# Patient Record
Sex: MALE | Race: OTHER | NOT HISPANIC OR LATINO | Employment: OTHER | URBAN - METROPOLITAN AREA
[De-identification: names, ages, dates, MRNs, and addresses within clinical notes are randomized per-mention and may not be internally consistent; named-entity substitution may affect disease eponyms.]

---

## 2018-03-20 ENCOUNTER — HOSPITAL ENCOUNTER (INPATIENT)
Facility: HOSPITAL | Age: 73
LOS: 2 days | Discharge: HOME | DRG: 151 | End: 2018-03-23
Attending: EMERGENCY MEDICINE | Admitting: INTERNAL MEDICINE
Payer: MEDICARE

## 2018-03-20 DIAGNOSIS — R04.0 EPISTAXIS: ICD-10-CM

## 2018-03-20 DIAGNOSIS — R00.0 SINUS TACHYCARDIA: Primary | ICD-10-CM

## 2018-03-20 LAB
ALBUMIN SERPL BCP-MCNC: 3.6 G/DL (ref 3.5–5)
ALP SERPL-CCNC: 89 U/L (ref 46–116)
ALT SERPL W P-5'-P-CCNC: 43 U/L (ref 12–78)
ANION GAP SERPL CALCULATED.3IONS-SCNC: 13 MMOL/L (ref 4–13)
APTT PPP: 27 SECONDS (ref 23–35)
AST SERPL W P-5'-P-CCNC: 22 U/L (ref 5–45)
ATRIAL RATE: 127 BPM
ATRIAL RATE: 136 BPM
BASOPHILS # BLD AUTO: 0.03 THOUSANDS/ΜL (ref 0–0.1)
BASOPHILS # BLD AUTO: 0.03 THOUSANDS/ΜL (ref 0–0.1)
BASOPHILS NFR BLD AUTO: 0 % (ref 0–1)
BASOPHILS NFR BLD AUTO: 0 % (ref 0–1)
BILIRUB SERPL-MCNC: 0.2 MG/DL (ref 0.2–1)
BUN SERPL-MCNC: 19 MG/DL (ref 5–25)
CALCIUM SERPL-MCNC: 9 MG/DL (ref 8.3–10.1)
CHLORIDE SERPL-SCNC: 104 MMOL/L (ref 100–108)
CO2 SERPL-SCNC: 23 MMOL/L (ref 21–32)
CREAT SERPL-MCNC: 0.74 MG/DL (ref 0.6–1.3)
EOSINOPHIL # BLD AUTO: 0.01 THOUSAND/ΜL (ref 0–0.61)
EOSINOPHIL # BLD AUTO: 0.18 THOUSAND/ΜL (ref 0–0.61)
EOSINOPHIL NFR BLD AUTO: 0 % (ref 0–6)
EOSINOPHIL NFR BLD AUTO: 2 % (ref 0–6)
ERYTHROCYTE [DISTWIDTH] IN BLOOD BY AUTOMATED COUNT: 13.1 % (ref 11.6–15.1)
ERYTHROCYTE [DISTWIDTH] IN BLOOD BY AUTOMATED COUNT: 13.1 % (ref 11.6–15.1)
GFR SERPL CREATININE-BSD FRML MDRD: 92 ML/MIN/1.73SQ M
GLUCOSE SERPL-MCNC: 134 MG/DL (ref 65–140)
HCT VFR BLD AUTO: 40 % (ref 36.5–49.3)
HCT VFR BLD AUTO: 44.9 % (ref 36.5–49.3)
HGB BLD-MCNC: 13.1 G/DL (ref 12–17)
HGB BLD-MCNC: 14.8 G/DL (ref 12–17)
INR PPP: 1 (ref 0.86–1.16)
LYMPHOCYTES # BLD AUTO: 1.76 THOUSANDS/ΜL (ref 0.6–4.47)
LYMPHOCYTES # BLD AUTO: 2.06 THOUSANDS/ΜL (ref 0.6–4.47)
LYMPHOCYTES NFR BLD AUTO: 17 % (ref 14–44)
LYMPHOCYTES NFR BLD AUTO: 28 % (ref 14–44)
MCH RBC QN AUTO: 31.5 PG (ref 26.8–34.3)
MCH RBC QN AUTO: 31.9 PG (ref 26.8–34.3)
MCHC RBC AUTO-ENTMCNC: 32.8 G/DL (ref 31.4–37.4)
MCHC RBC AUTO-ENTMCNC: 33 G/DL (ref 31.4–37.4)
MCV RBC AUTO: 96 FL (ref 82–98)
MCV RBC AUTO: 97 FL (ref 82–98)
MONOCYTES # BLD AUTO: 0.62 THOUSAND/ΜL (ref 0.17–1.22)
MONOCYTES # BLD AUTO: 0.86 THOUSAND/ΜL (ref 0.17–1.22)
MONOCYTES NFR BLD AUTO: 12 % (ref 4–12)
MONOCYTES NFR BLD AUTO: 6 % (ref 4–12)
NEUTROPHILS # BLD AUTO: 4.22 THOUSANDS/ΜL (ref 1.85–7.62)
NEUTROPHILS # BLD AUTO: 8.16 THOUSANDS/ΜL (ref 1.85–7.62)
NEUTS SEG NFR BLD AUTO: 57 % (ref 43–75)
NEUTS SEG NFR BLD AUTO: 77 % (ref 43–75)
NRBC BLD AUTO-RTO: 0 /100 WBCS
NRBC BLD AUTO-RTO: 0 /100 WBCS
P AXIS: 42 DEGREES
P AXIS: 53 DEGREES
PLATELET # BLD AUTO: 214 THOUSANDS/UL (ref 149–390)
PLATELET # BLD AUTO: 254 THOUSANDS/UL (ref 149–390)
PMV BLD AUTO: 10 FL (ref 8.9–12.7)
PMV BLD AUTO: 10.9 FL (ref 8.9–12.7)
POTASSIUM SERPL-SCNC: 3.9 MMOL/L (ref 3.5–5.3)
PR INTERVAL: 128 MS
PR INTERVAL: 160 MS
PROT SERPL-MCNC: 7.5 G/DL (ref 6.4–8.2)
PROTHROMBIN TIME: 13.4 SECONDS (ref 12.1–14.4)
QRS AXIS: 250 DEGREES
QRS AXIS: 258 DEGREES
QRSD INTERVAL: 114 MS
QRSD INTERVAL: 116 MS
QT INTERVAL: 290 MS
QT INTERVAL: 308 MS
QTC INTERVAL: 436 MS
QTC INTERVAL: 447 MS
RBC # BLD AUTO: 4.11 MILLION/UL (ref 3.88–5.62)
RBC # BLD AUTO: 4.7 MILLION/UL (ref 3.88–5.62)
SODIUM SERPL-SCNC: 140 MMOL/L (ref 136–145)
T WAVE AXIS: 18 DEGREES
T WAVE AXIS: 24 DEGREES
TROPONIN I SERPL-MCNC: <0.02 NG/ML
VENTRICULAR RATE: 127 BPM
VENTRICULAR RATE: 136 BPM
WBC # BLD AUTO: 10.62 THOUSAND/UL (ref 4.31–10.16)
WBC # BLD AUTO: 7.38 THOUSAND/UL (ref 4.31–10.16)

## 2018-03-20 PROCEDURE — 93005 ELECTROCARDIOGRAM TRACING: CPT

## 2018-03-20 PROCEDURE — 85025 COMPLETE CBC W/AUTO DIFF WBC: CPT | Performed by: EMERGENCY MEDICINE

## 2018-03-20 PROCEDURE — 96361 HYDRATE IV INFUSION ADD-ON: CPT

## 2018-03-20 PROCEDURE — 36415 COLL VENOUS BLD VENIPUNCTURE: CPT | Performed by: EMERGENCY MEDICINE

## 2018-03-20 PROCEDURE — 99220 PR INITIAL OBSERVATION CARE/DAY 70 MINUTES: CPT | Performed by: INTERNAL MEDICINE

## 2018-03-20 PROCEDURE — 85610 PROTHROMBIN TIME: CPT | Performed by: EMERGENCY MEDICINE

## 2018-03-20 PROCEDURE — 80053 COMPREHEN METABOLIC PANEL: CPT | Performed by: EMERGENCY MEDICINE

## 2018-03-20 PROCEDURE — 84484 ASSAY OF TROPONIN QUANT: CPT | Performed by: EMERGENCY MEDICINE

## 2018-03-20 PROCEDURE — 93010 ELECTROCARDIOGRAM REPORT: CPT | Performed by: INTERNAL MEDICINE

## 2018-03-20 PROCEDURE — 85730 THROMBOPLASTIN TIME PARTIAL: CPT | Performed by: EMERGENCY MEDICINE

## 2018-03-20 PROCEDURE — 96374 THER/PROPH/DIAG INJ IV PUSH: CPT

## 2018-03-20 RX ORDER — CLOPIDOGREL BISULFATE 75 MG/1
75 TABLET ORAL DAILY
Status: DISCONTINUED | OUTPATIENT
Start: 2018-03-21 | End: 2018-03-23 | Stop reason: HOSPADM

## 2018-03-20 RX ORDER — TRANEXAMIC ACID 100 MG/ML
500 INJECTION, SOLUTION INTRAVENOUS ONCE
Status: COMPLETED | OUTPATIENT
Start: 2018-03-20 | End: 2018-03-20

## 2018-03-20 RX ORDER — OXYCODONE HYDROCHLORIDE 5 MG/1
5 TABLET ORAL 2 TIMES DAILY PRN
Status: DISCONTINUED | OUTPATIENT
Start: 2018-03-20 | End: 2018-03-23 | Stop reason: HOSPADM

## 2018-03-20 RX ORDER — ACETAMINOPHEN 325 MG/1
650 TABLET ORAL EVERY 4 HOURS PRN
Status: DISCONTINUED | OUTPATIENT
Start: 2018-03-20 | End: 2018-03-23 | Stop reason: HOSPADM

## 2018-03-20 RX ORDER — OXYMETAZOLINE HYDROCHLORIDE 0.05 G/100ML
2 SPRAY NASAL ONCE
Status: COMPLETED | OUTPATIENT
Start: 2018-03-20 | End: 2018-03-20

## 2018-03-20 RX ORDER — METOPROLOL TARTRATE 5 MG/5ML
5 INJECTION INTRAVENOUS ONCE
Status: COMPLETED | OUTPATIENT
Start: 2018-03-20 | End: 2018-03-20

## 2018-03-20 RX ADMIN — LIDOCAINE HYDROCHLORIDE 5 ML: 20 SOLUTION ORAL; TOPICAL at 18:26

## 2018-03-20 RX ADMIN — METOPROLOL TARTRATE 5 MG: 5 INJECTION, SOLUTION INTRAVENOUS at 20:30

## 2018-03-20 RX ADMIN — OXYMETAZOLINE HYDROCHLORIDE 2 SPRAY: 5 SPRAY NASAL at 18:27

## 2018-03-20 RX ADMIN — TRANEXAMIC ACID 500 MG: 100 INJECTION, SOLUTION INTRAVENOUS at 18:27

## 2018-03-20 RX ADMIN — SODIUM CHLORIDE 1000 ML: 0.9 INJECTION, SOLUTION INTRAVENOUS at 18:27

## 2018-03-20 NOTE — ED PROVIDER NOTES
History  Chief Complaint   Patient presents with    Nose Bleed     pt had nose bleed 1 wk ago was stopped by EMS then, nose bleed again today on Plavix, still actively bleeding, hypertensive and tachy with no hx of HTN or arrythmias        Nose Bleed   Location:  R nare  Severity:  Moderate  Duration:  2 hours  Timing:  Constant  Progression:  Partially resolved  Chronicity:  Recurrent  Context: anticoagulants, aspirin use, elevation change and foreign body    Context: not home oxygen and not nose picking    Relieved by:  Applying pressure  Worsened by:  Nothing  Ineffective treatments:  Applying pressure  Associated symptoms: no blood in oropharynx, no congestion, no cough, no fever and no sneezing    Risk factors: alcohol use (reports when family is over)    Risk factors: no frequent nosebleeds        None       Past Medical History:   Diagnosis Date    Deviated nasal septum     Stroke [facility])        History reviewed  No pertinent surgical history  History reviewed  No pertinent family history  I have reviewed and agree with the history as documented  Social History   Substance Use Topics    Smoking status: Former Smoker    Smokeless tobacco: Never Used    Alcohol use No        Review of Systems   Constitutional: Negative for fever  HENT: Positive for nosebleeds  Negative for congestion and sneezing  Respiratory: Negative for cough  All other systems reviewed and are negative  Physical Exam  ED Triage Vitals [03/20/18 1741]   Temp Pulse Respirations Blood Pressure SpO2   -- (!) 131 18 (!) 157/108 97 %      Temp src Heart Rate Source Patient Position - Orthostatic VS BP Location FiO2 (%)   -- Monitor Sitting Right arm --      Pain Score       No Pain           Orthostatic Vital Signs  Vitals:    03/20/18 1741   BP: (!) 157/108   Pulse: (!) 131   Patient Position - Orthostatic VS: Sitting       Physical Exam   Constitutional: He is oriented to person, place, and time   He appears well-developed and well-nourished  No distress  HENT:   Head: Normocephalic and atraumatic  Nose: Epistaxis (from left nare with soaked packing, no blood in posterior oropharynx) is observed  Eyes: EOM are normal  Pupils are equal, round, and reactive to light  Neck: Neck supple  Cardiovascular: Regular rhythm  Tachycardia present  Pulmonary/Chest: Effort normal and breath sounds normal    Abdominal: Soft  There is no tenderness  Musculoskeletal: He exhibits no edema  Neurological: He is alert and oriented to person, place, and time  Left sided hemiparesis 2/5 strength, chronic from an old stroke   Skin: Capillary refill takes less than 2 seconds  He is not diaphoretic  No pallor  ED Medications  Medications - No data to display    Diagnostic Studies  Results Reviewed     None                 No orders to display              Procedures  Procedures       Phone Contacts  ED Phone Contact    ED Course  ED Course                                MDM  Number of Diagnoses or Management Options  Epistaxis: new and requires workup  Sinus tachycardia: new and requires workup     Amount and/or Complexity of Data Reviewed  Clinical lab tests: ordered and reviewed  Discuss the patient with other providers: yes (ENT)  Independent visualization of images, tracings, or specimens: yes    Risk of Complications, Morbidity, and/or Mortality  Presenting problems: high    Patient Progress  Patient progress: improved (Patient was packed in the emergency department and bleeding stopped  He has no feeling of bleeding down the back of his throat  However it is unclear why patient is tachycardic  He has been persistently sinus tach with repeat EKGs and evaluated on the monitor and he has been sinus tachycardic in the 140-1 50s with a right bundle branch block  Patient was given 5 of Lopressor which did bring his heart rate to under 100    Patient has no chest pain shortness of breath and is asymptomatic but unclear etiology of why he is so tachycardic  Patient has prior stroke and lives alone  Repeated CBC to see if patient really did have such a significant nosebleed to make him so tachycardic, but his exam did not seem consistent with tachycardia related to hemorrhagic blood loss because patient was not pale, sweaty diaphoretic and was not symptomatic or dizzy  Repeat CBC shows a drop of 1 but this would not explain his tachycardia  Given significant unexplained tachycardia patient needs to be admitted to the hospital for or tele evaluation and cardiology evaluation  I called and discussed with ENT and they do come to [facility] in consult and they could see him tomorrow and addresses packing and nosebleed as well )    CritCare Time    Disposition  Final diagnoses:   None     ED Disposition     None      Follow-up Information    None       Patient's Medications    No medications on file     No discharge procedures on file      ED Provider  Electronically Signed by           Paul Riddle MD  03/22/18 5425

## 2018-03-20 NOTE — ED PROCEDURE NOTE
PROCEDURE  Epistaxis Mgmt  Date/Time: 3/20/2018 7:22 PM  Performed by: Yee Rodriguez  Authorized by: Yee Rodriguez     Patient location:  ED  Other Assisting Provider: No    Consent:     Consent obtained:  Verbal    Consent given by:  Patient    Risks discussed:  Bleeding and pain    Alternatives discussed:  Referral  Universal protocol:     Patient identity confirmed:  Verbally with patient  Anesthesia (see MAR for exact dosages): Anesthesia method:  Topical application    Local Therapeutics:  Lidocaine gel and oxymetazoline (Afrin) (TXA)  Procedure details:     Treatment site:  R anterior    Hemostasis method:  Merocel sponge    Approach:  External  Post-procedure details:     Assessment:  Bleeding stopped    Patient tolerance of procedure:   Tolerated well, no immediate complications         Flo Burger MD  03/20/18 7955

## 2018-03-21 LAB
ANION GAP SERPL CALCULATED.3IONS-SCNC: 8 MMOL/L (ref 4–13)
BUN SERPL-MCNC: 23 MG/DL (ref 5–25)
CALCIUM SERPL-MCNC: 8.4 MG/DL (ref 8.3–10.1)
CHLORIDE SERPL-SCNC: 107 MMOL/L (ref 100–108)
CO2 SERPL-SCNC: 25 MMOL/L (ref 21–32)
CREAT SERPL-MCNC: 0.71 MG/DL (ref 0.6–1.3)
ERYTHROCYTE [DISTWIDTH] IN BLOOD BY AUTOMATED COUNT: 13.1 % (ref 11.6–15.1)
GFR SERPL CREATININE-BSD FRML MDRD: 94 ML/MIN/1.73SQ M
GLUCOSE SERPL-MCNC: 113 MG/DL (ref 65–140)
HCT VFR BLD AUTO: 36.5 % (ref 36.5–49.3)
HGB BLD-MCNC: 12.2 G/DL (ref 12–17)
MCH RBC QN AUTO: 32.7 PG (ref 26.8–34.3)
MCHC RBC AUTO-ENTMCNC: 33.4 G/DL (ref 31.4–37.4)
MCV RBC AUTO: 98 FL (ref 82–98)
PLATELET # BLD AUTO: 184 THOUSANDS/UL (ref 149–390)
PMV BLD AUTO: 10.6 FL (ref 8.9–12.7)
POTASSIUM SERPL-SCNC: 4.1 MMOL/L (ref 3.5–5.3)
RBC # BLD AUTO: 3.73 MILLION/UL (ref 3.88–5.62)
SODIUM SERPL-SCNC: 140 MMOL/L (ref 136–145)
WBC # BLD AUTO: 7.67 THOUSAND/UL (ref 4.31–10.16)

## 2018-03-21 PROCEDURE — 99233 SBSQ HOSP IP/OBS HIGH 50: CPT | Performed by: INTERNAL MEDICINE

## 2018-03-21 PROCEDURE — 36415 COLL VENOUS BLD VENIPUNCTURE: CPT | Performed by: PHYSICIAN ASSISTANT

## 2018-03-21 PROCEDURE — 99284 EMERGENCY DEPT VISIT MOD MDM: CPT

## 2018-03-21 PROCEDURE — 2Y41X5Z PACKING OF NASAL REGION USING PACKING MATERIAL: ICD-10-PCS | Performed by: INTERNAL MEDICINE

## 2018-03-21 PROCEDURE — 80048 BASIC METABOLIC PNL TOTAL CA: CPT | Performed by: PHYSICIAN ASSISTANT

## 2018-03-21 PROCEDURE — 85027 COMPLETE CBC AUTOMATED: CPT | Performed by: PHYSICIAN ASSISTANT

## 2018-03-21 RX ORDER — OXYMETAZOLINE HYDROCHLORIDE 0.05 G/100ML
2 SPRAY NASAL EVERY 12 HOURS PRN
Status: DISCONTINUED | OUTPATIENT
Start: 2018-03-21 | End: 2018-03-23 | Stop reason: HOSPADM

## 2018-03-21 RX ORDER — METOPROLOL TARTRATE 5 MG/5ML
5 INJECTION INTRAVENOUS
Status: DISCONTINUED | OUTPATIENT
Start: 2018-03-21 | End: 2018-03-22

## 2018-03-21 RX ORDER — ALPRAZOLAM 0.5 MG/1
0.5 TABLET ORAL 3 TIMES DAILY PRN
Status: DISCONTINUED | OUTPATIENT
Start: 2018-03-21 | End: 2018-03-23 | Stop reason: HOSPADM

## 2018-03-21 RX ORDER — LORAZEPAM 0.5 MG/1
0.5 TABLET ORAL EVERY 8 HOURS PRN
Status: DISCONTINUED | OUTPATIENT
Start: 2018-03-21 | End: 2018-03-21

## 2018-03-21 RX ADMIN — OXYMETAZOLINE HYDROCHLORIDE 2 SPRAY: 5 SPRAY NASAL at 21:34

## 2018-03-21 RX ADMIN — OXYCODONE HYDROCHLORIDE 5 MG: 5 TABLET ORAL at 07:00

## 2018-03-21 RX ADMIN — ACETAMINOPHEN 650 MG: 325 TABLET, FILM COATED ORAL at 11:55

## 2018-03-21 RX ADMIN — OXYCODONE HYDROCHLORIDE 5 MG: 5 TABLET ORAL at 00:54

## 2018-03-21 RX ADMIN — LORAZEPAM 0.5 MG: 0.5 TABLET ORAL at 13:39

## 2018-03-21 RX ADMIN — METOPROLOL TARTRATE 5 MG: 5 INJECTION, SOLUTION INTRAVENOUS at 21:42

## 2018-03-21 RX ADMIN — OXYMETAZOLINE HYDROCHLORIDE 2 SPRAY: 5 SPRAY NASAL at 13:39

## 2018-03-21 RX ADMIN — OXYCODONE HYDROCHLORIDE 5 MG: 5 TABLET ORAL at 21:32

## 2018-03-21 RX ADMIN — ACETAMINOPHEN 650 MG: 325 TABLET, FILM COATED ORAL at 17:27

## 2018-03-21 NOTE — ED NOTES
Patient refuses breakfast at this time   States "i can't eat with my nose packed like this, I have to breathe through my mouth "     Ac Choudhary RN  03/21/18 1746

## 2018-03-21 NOTE — PROGRESS NOTES
Epistaxis addressed by ED team overnight with packing  Plan is to leave packing in place for minimum 24-48 hours, hold asa/plavix or both if able, nosebleed precautions  If patient to remain in the hospital during this time due to tachycardia workup, will plan to pull pack at bedside, otherwise he may follow up as outpatient (Thur/Fri in one of the [employer] on Monday)  Continue abx ppx in the meantime

## 2018-03-21 NOTE — PLAN OF CARE
Problem: PAIN - ADULT  Goal: Verbalizes/displays adequate comfort level or baseline comfort level  Interventions:  - Encourage patient to monitor pain and request assistance  - Assess pain using appropriate pain scale  - Administer analgesics based on type and severity of pain and evaluate response  - Implement non-pharmacological measures as appropriate and evaluate response  - Consider cultural and social influences on pain and pain management  - Notify physician/advanced practitioner if interventions unsuccessful or patient reports new pain  Outcome: Progressing      Problem: SAFETY ADULT  Goal: Patient will remain free of falls  INTERVENTIONS:  - Assess patient frequently for physical needs  -  Identify cognitive and physical deficits and behaviors that affect risk of falls    -  [place] fall precautions as indicated by assessment   - Educate patient/family on patient safety including physical limitations  - Instruct patient to call for assistance with activity based on assessment  - Modify environment to reduce risk of injury  - Consider OT/PT consult to assist with strengthening/mobility  Outcome: Progressing    Goal: Maintain or return to baseline ADL function  INTERVENTIONS:  -  Assess patient's ability to carry out ADLs; assess patient's baseline for ADL function and identify physical deficits which impact ability to perform ADLs (bathing, care of mouth/teeth, toileting, grooming, dressing, etc )  - Assess/evaluate cause of self-care deficits   - Assess range of motion  - Assess patient's mobility; develop plan if impaired  - Assess patient's need for assistive devices and provide as appropriate  - Encourage maximum independence but intervene and supervise when necessary  ¯ Involve family in performance of ADLs  ¯ Assess for home care needs following discharge   ¯ Request OT consult to assist with ADL evaluation and planning for discharge  ¯ Provide patient education as appropriate  Outcome: Progressing    Goal: Maintain or return mobility status to optimal level  INTERVENTIONS:  - Assess patient's baseline mobility status (ambulation, transfers, stairs, etc )    - Identify cognitive and physical deficits and behaviors that affect mobility  - Identify mobility aids required to assist with transfers and/or ambulation (gait belt, sit-to-stand, lift, walker, cane, etc )  - [place] fall precautions as indicated by assessment  - Record patient progress and toleration of activity level on Mobility SBAR; progress patient to next Phase/Stage  - Instruct patient to call for assistance with activity based on assessment  - Request Rehabilitation consult to assist with strengthening/weightbearing, etc   Outcome: Progressing

## 2018-03-21 NOTE — H&P
H&P- Desmond Yoder [id number], 67 y o  male MRN: [id number]    Unit/Bed#: ED 27 Encounter: [id number]    Primary Care Provider: Alejandra Blair MD   Date and time admitted to hospital: 3/20/2018  5:37 PM        * Tachycardia   Assessment & Plan    Admit telemetry  P r n  Lopressor        Epistaxis   Assessment & Plan    Maintain packing  Consult ENT              VTE Prophylaxis: Pharmacologic VTE Prophylaxis contraindicated due to Epistaxis  / sequential compression device   Code Status:  Full  POLST: There is no POLST form on file for this patient (pre-hospital)  Discussion with family:  There is no family present for evaluation  Anticipated Length of Stay:  Patient will be admitted on an Observation basis with an anticipated length of stay of  less than 2 midnights  Justification for Hospital Stay:  Observation, ENT consult    Total Time for Visit, including Counseling / Coordination of Care: 30 minutes  Greater than 50% of this total time spent on direct patient counseling and coordination of care  Chief Complaint:   Epistasis    History of Present Illness:    Desmond Yoder is a 67 y o  male who presents with complaints of a blood nose that began around noon today  Patient denies any chest pain or shortness of breath  Patient states he is not able to get the bleeding to stop  Patient takes Plavix secondary history of CVA  One emerged department is noted the patient was tachycardic up to the 160s  Patient is asymptomatic with this  Patient states he had may have had some is similar in the past for he was monitored overnight and cleared  Review of Systems:    Review of Systems   Cardiovascular: Positive for palpitations  All other systems reviewed and are negative  Past Medical and Surgical History:     Past Medical History:   Diagnosis Date    Deviated nasal septum     Stroke [facility])        History reviewed  No pertinent surgical history      Meds/Allergies:    Prior to Admission medications    Not on File     I have reviewed home medications with patient personally  Allergies: No Known Allergies    Social History:     Marital Status: /Civil Union   Patient Pre-hospital Living Situation:  Lives at home  Patient Pre-hospital Level of Mobility:  Ambulatory  Patient Pre-hospital Diet Restrictions:  None  Substance Use History:   History   Alcohol Use No     History   Smoking Status    Former Smoker   Smokeless Tobacco    Never Used     History   Drug Use No       Family History:    History reviewed  No pertinent family history  Physical Exam:     Vitals:   Blood Pressure: (!) 157/108 (03/20/18 1741)  Pulse: (!) 135 (03/1945)  Temperature: 97 7 °F (36 5 °C) (03/20/18 1805)  Temp Source: Oral (03/20/18 1805)  Respirations: 19 (03/1945)  Height: 5' 7" (170 2 cm) (03/20/18 1741)  Weight - Scale: 88 kg (194 lb) (03/20/18 1741)  SpO2: 97 % (03/20/18 1741)    Physical Exam   Constitutional: He is oriented to person, place, and time  He appears well-developed and well-nourished  HENT:   Head: Normocephalic and atraumatic  Right Ear: External ear normal    Left Ear: External ear normal    Nose is packed  There is dried blood from the nose bleed noted on his face  Eyes: Conjunctivae and EOM are normal  Pupils are equal, round, and reactive to light  Neck: Normal range of motion  Cardiovascular: Normal rate, regular rhythm, normal heart sounds and intact distal pulses  Pulmonary/Chest: Effort normal and breath sounds normal    Abdominal: Bowel sounds are normal    Musculoskeletal: Normal range of motion  Neurological: He is alert and oriented to person, place, and time  Skin: Skin is warm and dry  Psychiatric: He has a normal mood and affect  His behavior is normal  Judgment and thought content normal          Additional Data:     Lab Results: I have personally reviewed pertinent reports          Results from last 7 days  Lab Units 03/20/18  2918   WBC Thousand/uL 10 62*   HEMOGLOBIN g/dL 13 1   HEMATOCRIT % 40 0   PLATELETS Thousands/uL 214   NEUTROS PCT % 77*   LYMPHS PCT % 17   MONOS PCT % 6   EOS PCT % 0       Results from last 7 days  Lab Units 03/20/18  1814   SODIUM mmol/L 140   POTASSIUM mmol/L 3 9   CHLORIDE mmol/L 104   CO2 mmol/L 23   BUN mg/dL 19   CREATININE mg/dL 0 74   CALCIUM mg/dL 9 0   TOTAL PROTEIN g/dL 7 5   BILIRUBIN TOTAL mg/dL 0 20   ALK PHOS U/L 89   ALT U/L 43   AST U/L 22   GLUCOSE RANDOM mg/dL 134       Results from last 7 days  Lab Units 03/20/18  1814   INR  1 00       Imaging: I have personally reviewed pertinent reports  No orders to display       EKG, Pathology, and Other Studies Reviewed on Admission:   · EKG:  Sinus tachycardia, right bundle branch block    Allscripts / Epic Records Reviewed: Yes     ** Please Note: This note has been constructed using a voice recognition system   **

## 2018-03-21 NOTE — CASE MANAGEMENT
Initial Clinical Review    Admission: Date/Time/Statement: OBS order   3/20  2259    Orders Placed This Encounter   Procedures    Place in Observation (expected length of stay for this patient is less than two midnights)     Standing Status:   Standing     Number of Occurrences:   1     Order Specific Question:   Admitting Physician     Answer:   Vania Pang [[id number]]     Order Specific Question:   Level of Care     Answer:   Med Surg [16]         ED: Date/Time/Mode of Arrival:   ED Arrival Information     Expected Arrival Acuity Means of Arrival Escorted By Service Admission Type    - 3/20/2018 17:36 Emergent Ambulance [facility]) General Medicine Emergency    Arrival Complaint    NOSE BLEED          Chief Complaint:   Chief Complaint   Patient presents with    Nose Bleed     pt had nose bleed 1 wk ago was stopped by EMS then, nose bleed again today on Plavix, still actively bleeding, hypertensive and tachy with no hx of HTN or arrythmias        History of Illness: Suman Ivory is a 67 y o  male who presents with complaints of a blood nose that began around noon today  Patient denies any chest pain or shortness of breath  Patient states he is not able to get the bleeding to stop  Patient takes Plavix secondary history of CVA  One emerged department is noted the patient was tachycardic up to the 160s  Patient is asymptomatic with this    Patient states he had may have had some is similar in the past for he was monitored overnight and cleared        ED Vital Signs:   ED Triage Vitals   Temperature Pulse Respirations Blood Pressure SpO2   03/20/18 1805 03/20/18 1741 03/20/18 1741 03/20/18 1741 03/20/18 1741   97 7 °F (36 5 °C) (!) 131 18 (!) 157/108 97 %      Temp Source Heart Rate Source Patient Position - Orthostatic VS BP Location FiO2 (%)   03/20/18 1805 03/20/18 1741 03/20/18 1741 03/20/18 1741 --   Oral Monitor Sitting Right arm       Pain Score       03/20/18 1741       No Pain        Wt Readings from Last 1 Encounters:   03/20/18 88 kg (194 lb)       Vital Signs (abnormal):   03/21/18 0519 -- 91 20 125/83 96 % -- LR   03/21/18 0345 -- 88 20 -- 95 % -- JLG   03/21/18 0056 --  109 20 134/94 96 % -- JLG   03/1945 --  135 19           Abnormal Labs/Diagnostic Test Results: wbc  10 62  EKG - ST  136 and 127    ED Treatment:   Medication Administration from 03/20/2018 1736 to 03/21/2018 1383       Date/Time Order Dose Route Action Action by Comments     03/20/2018 2115 sodium chloride 0 9 % bolus 1,000 mL 0 mL Intravenous Stopped Huan Reyes RN      03/20/2018 1827 sodium chloride 0 9 % bolus 1,000 mL 1,000 mL [facility]venous [facility]      03/20/2018 1827 oxymetazoline (AFRIN) 0 05 % nasal spray 2 spray 2 spray Each Nare Given Charles Ryan RN      03/20/2018 1827 tranexamic acid 100mg/mL (for epistaxis) 500 mg 500 mg Nasal Given Charles Ryan RN      03/20/2018 1826 lidocaine viscous (XYLOCAINE) 2 % mucosal solution 5 mL 5 mL Oral Given Charles Ryan RN      03/20/2018 2030 metoprolol (LOPRESSOR) injection 5 mg 5 mg Intravenous Given Huan Reyes RN      03/21/2018 0700 oxyCODONE (ROXICODONE) IR tablet 5 mg 5 mg Oral Given Angelique Vásquez RN      03/21/2018 0054 oxyCODONE (ROXICODONE) IR tablet 5 mg 5 mg Oral Given Taina Reyes RN           Past Medical/Surgical History: Active Ambulatory Problems     Diagnosis Date Noted    No Active Ambulatory Problems     Resolved Ambulatory Problems     Diagnosis Date Noted    No Resolved Ambulatory Problems     Past Medical History:   Diagnosis Date    Deviated nasal septum     Stroke [facility])        Admitting Diagnosis: Bleeding from the nose [R04 0]    Age/Sex: 67 y o  male    Assessment/Plan:   Tachycardia   Assessment & Plan     Admit telemetry  P r n   Lopressor          Epistaxis   Assessment & Plan     Maintain packing  Consult ENT             VTE Prophylaxis: Pharmacologic VTE Prophylaxis contraindicated due to Epistaxis  / sequential compression device   Code Status:  Full  POLST: There is no POLST form on file for this patient (pre-hospital)  Discussion with family:  There is no family present for evaluation      Anticipated Length of Stay:  Patient will be admitted on an Observation basis with an anticipated length of stay of  less than 2 midnights     Justification for Hospital Stay:  Observation, ENT consult      Admission Orders:  Scheduled Meds:   Current Facility-Administered Medications:  acetaminophen 650 mg Oral Q4H PRN NAZ Mason-LIZETTE   clopidogrel 75 mg Oral Daily NAZ Mason-LIZETTE   oxyCODONE 5 mg Oral BID PRN Saira Blount PA-C     Continuous Infusions:    PRN Meds:   acetaminophen    oxyCODONE    Reg diet   Up and OOB as beverly   EKG prn cp   ENT consult   Tele   SCD  3/21 cbc

## 2018-03-22 LAB
ANION GAP SERPL CALCULATED.3IONS-SCNC: 9 MMOL/L (ref 4–13)
BASOPHILS # BLD AUTO: 0.03 THOUSANDS/ΜL (ref 0–0.1)
BASOPHILS NFR BLD AUTO: 0 % (ref 0–1)
BUN SERPL-MCNC: 20 MG/DL (ref 5–25)
CALCIUM SERPL-MCNC: 8.7 MG/DL (ref 8.3–10.1)
CHLORIDE SERPL-SCNC: 103 MMOL/L (ref 100–108)
CO2 SERPL-SCNC: 27 MMOL/L (ref 21–32)
CREAT SERPL-MCNC: 0.88 MG/DL (ref 0.6–1.3)
EOSINOPHIL # BLD AUTO: 0.12 THOUSAND/ΜL (ref 0–0.61)
EOSINOPHIL NFR BLD AUTO: 2 % (ref 0–6)
ERYTHROCYTE [DISTWIDTH] IN BLOOD BY AUTOMATED COUNT: 13.3 % (ref 11.6–15.1)
GFR SERPL CREATININE-BSD FRML MDRD: 86 ML/MIN/1.73SQ M
GLUCOSE SERPL-MCNC: 106 MG/DL (ref 65–140)
HCT VFR BLD AUTO: 36.9 % (ref 36.5–49.3)
HGB BLD-MCNC: 12.1 G/DL (ref 12–17)
LYMPHOCYTES # BLD AUTO: 1.85 THOUSANDS/ΜL (ref 0.6–4.47)
LYMPHOCYTES NFR BLD AUTO: 23 % (ref 14–44)
MCH RBC QN AUTO: 31.9 PG (ref 26.8–34.3)
MCHC RBC AUTO-ENTMCNC: 32.8 G/DL (ref 31.4–37.4)
MCV RBC AUTO: 97 FL (ref 82–98)
MONOCYTES # BLD AUTO: 0.76 THOUSAND/ΜL (ref 0.17–1.22)
MONOCYTES NFR BLD AUTO: 10 % (ref 4–12)
NEUTROPHILS # BLD AUTO: 5.19 THOUSANDS/ΜL (ref 1.85–7.62)
NEUTS SEG NFR BLD AUTO: 65 % (ref 43–75)
NRBC BLD AUTO-RTO: 0 /100 WBCS
PLATELET # BLD AUTO: 218 THOUSANDS/UL (ref 149–390)
PMV BLD AUTO: 10.6 FL (ref 8.9–12.7)
POTASSIUM SERPL-SCNC: 4 MMOL/L (ref 3.5–5.3)
RBC # BLD AUTO: 3.79 MILLION/UL (ref 3.88–5.62)
SODIUM SERPL-SCNC: 139 MMOL/L (ref 136–145)
WBC # BLD AUTO: 7.99 THOUSAND/UL (ref 4.31–10.16)

## 2018-03-22 PROCEDURE — 85025 COMPLETE CBC W/AUTO DIFF WBC: CPT | Performed by: INTERNAL MEDICINE

## 2018-03-22 PROCEDURE — 99233 SBSQ HOSP IP/OBS HIGH 50: CPT | Performed by: INTERNAL MEDICINE

## 2018-03-22 PROCEDURE — 80048 BASIC METABOLIC PNL TOTAL CA: CPT | Performed by: INTERNAL MEDICINE

## 2018-03-22 RX ADMIN — ALPRAZOLAM 0.5 MG: 0.5 TABLET ORAL at 23:31

## 2018-03-22 RX ADMIN — METOPROLOL TARTRATE 25 MG: 25 TABLET ORAL at 00:00

## 2018-03-22 RX ADMIN — OXYCODONE HYDROCHLORIDE 5 MG: 5 TABLET ORAL at 22:02

## 2018-03-22 RX ADMIN — METOPROLOL TARTRATE 25 MG: 25 TABLET ORAL at 08:50

## 2018-03-22 RX ADMIN — OXYMETAZOLINE HYDROCHLORIDE 2 SPRAY: 5 SPRAY NASAL at 21:59

## 2018-03-22 RX ADMIN — ACETAMINOPHEN 650 MG: 325 TABLET, FILM COATED ORAL at 05:00

## 2018-03-22 NOTE — PROGRESS NOTES
Packing was removed from the right naris  Source of bleeding likely from indentation along right anterior nasal septum  No active bleeding or obvious vessel there  Applied bacitracin      Continue nosebleed precautions  Would hold ASA/Plavix x 2-3 more days  Afrin/pressure PRN nosebleed  Hold Flonase x 1 week  Bacitracin to right naris BID x 3-4 days  Follow up ENT in 1-2 weeks

## 2018-03-22 NOTE — CASE MANAGEMENT
Notification of Inpatient Admission/Inpatient Authorization Request  This is a Notification of Inpatient Admission/Request for Inpatient Authorization to our facility [facility]  Please be advised that this patient is currently in our facility under Inpatient Status  Below you will find the Attending Physician and Facilitys information including NPI# and contact information for the Utilization  assigned to the [facility] where the patient is receiving services  Please feel free to contact the Utilization Review Department with any questions  Patient Information:  PATIENT NAME: Suman Ivory  MRN: [id number]  YOB: 1945    PRESENTATION DATE: 3/20/2018  5:37 PM  IP ADMISSION DATE: 3/21/18 2243  DISCHARGE DATE: No discharge date for patient encounter  DISPOSITION: Final discharge disposition not confirmed    Attending Physician:  Attending Physician:  Dr Tom Cox   NPI:  [id number]  [address] [place] [address]  [phone]  [facility]ility:  [facility]  Address: [address] [place] [address]    Phone: [phone]  Tax ID [id number]  NPI [id number]   Medicare: [id number]    [facility] in the [facility] by Parth Hines for 2017  Network Utilization Review Department  Phone: [phone]; Fax [phone]  ATTENTION: The Network Utilization Review Department is now centralized for our 7 Facilities  Make a note that we have a new phone and fax numbers for our Department  Please call with any questions or concerns to [phone] and carefully follow the prompts so that you are directed to the right person  All voicemails are confidential  Fax any determinations, approvals, denials, and requests for initial or continue stay review clinical to [phone]   Due to HIGH CALL volume, it would be easier if you could please send faxed requests to expedite your requests and in part, help us provide discharge notifications faster

## 2018-03-22 NOTE — PROGRESS NOTES
[facility] Progress Note  Patient: Romaine Sparks 67 y o  male   MRN: [id number]  PCP: Axel Lloyd MD  Unit/Bed#: -01 Encounter: [id number]  Date Of Visit: 18    Assessment:    Principal Problem:    Tachycardia  Active Problems:    Epistaxis      Plan:     1  Tachycardia  Unknown cause at this time  Denies chest pain denies shortness of breath the last dictations  EKG reviewed  Continue his beta-blockers    #2 epistaxis  Anterior packing/history in detail with ENT  "Plan to come see the patient tomorrow and remove the packing"  Patient explained treatment plan and agreeable to stay   Overnight      VTE Pharmacologic Prophylaxis:   Pharmacologic: Heparin  Mechanical VTE Prophylaxis in Place: Yes    Patient Centered Rounds: I have performed bedside rounds with nursing staff today  Discussions with Specialists or Other Care Team Provider: Tests  Education and Discussions with Family / Patient: Yes    Time Spent for Care: 45 minutes  More than 50% of total time spent on counseling and coordination of care as described above  Current Length of Stay: 0 day(s)    Current Patient Status: Inpatient   Certification Statement: The patient will continue to require additional inpatient hospital stay due to Epistaxis and tachyca    Discharge Plan / Estimated Discharge Date: To be determined    Code Status: Level 1 - Full Code      Subjective:   No complaints    Objective:     Vitals:   Temp (24hrs), Av 8 °F (36 6 °C), Min:97 8 °F (36 6 °C), Max:97 8 °F (36 6 °C)    HR:  [] 123  Resp:  [16-20] 16  BP: (125-168)/() 150/106  SpO2:  [95 %-98 %] 96 %  Body mass index is 30 38 kg/m²  Input and Output Summary (last 24 hours):     No intake or output data in the 24 hours ending 18 4527    Physical Exam:     Physical Exam  On examination he appeared in good health, Although appears to be very anxious Patient has normal mooand spirits  Anterior packing appreciated without any active bleeding Vital signs as documented  Skin warm and dry and without overt rashes  Neck without JVD  Lungs clear  Heart exam notable for regular rhythm, normal sounds and absence of murmurs, rubs or gallops  Abdomen unremarkable and without evidence of organomegaly, masses, or abdominal aortic enlargement  Extremities nonedematous  Labs:      Results from last 7 days  Lab Units 03/21/18  0519 03/20/18  2247   WBC Thousand/uL 7 67 10 62*   HEMOGLOBIN g/dL 12 2 13 1   HEMATOCRIT % 36 5 40 0   PLATELETS Thousands/uL 184 214   NEUTROS PCT %  --  77*   LYMPHS PCT %  --  17   MONOS PCT %  --  6   EOS PCT %  --  0       Results from last 7 days  Lab Units 03/21/18  0519 03/20/18  1814   SODIUM mmol/L 140 140   POTASSIUM mmol/L 4 1 3 9   CHLORIDE mmol/L 107 104   CO2 mmol/L 25 23   BUN mg/dL 23 19   CREATININE mg/dL 0 71 0 74   CALCIUM mg/dL 8 4 9 0   TOTAL PROTEIN g/dL  --  7 5   BILIRUBIN TOTAL mg/dL  --  0 20   ALK PHOS U/L  --  89   ALT U/L  --  43   AST U/L  --  22   GLUCOSE RANDOM mg/dL 113 134       Results from last 7 days  Lab Units 03/20/18  1814   INR  1 00       * I Have Reviewed All Lab Data Listed Above  * Additional Pertinent Lab Tests Reviewed:  [facility] Admission Reviewed    Imaging:    Imaging Reports Reviewed Today Include: Yes  Imaging Personally Reviewed by Myself Includes:  S    Recent Cultures (last 7 days):           Last 24 Hours Medication List:     Current Facility-Administered Medications:  acetaminophen 650 mg Oral Q4H PRN Anjum Leach PA-C   ALPRAZolam 0 5 mg Oral TID PRN Bradly Hirsch MD   clopidogrel 75 mg Oral Daily Anjum Leach PA-C   LORazepam 0 5 mg Oral Q8H PRN Bradly Hirsch MD   metoprolol 5 mg Intravenous Q3H PRN Bradly Hirsch MD   oxyCODONE 5 mg Oral BID PRN Anjum Leach PA-C   oxymetazoline 2 spray Each Nare Q12H PRN Briana Hauser MD        Today, Patient Was Seen By: Bradly Hirsch MD    ** Please Note: This note has been constructed using a voice recognition system   **

## 2018-03-22 NOTE — CASE MANAGEMENT
Continued Stay Review    Date: 3/22/2018  Start   Ordered   03/21/18 2244  Inpatient Admission Once     Transfer Service: General Medicine       Question Answer Comment   Admitting Physician Ama Campbell    Level of Care Med Surg    Estimated length of stay More than 2 Midnights    Certification I certify that inpatient services are medically necessary for this patient for a duration of greater than two midnights  See H&P and MD Progress Notes for additional information about the patient's course of treatment         03/21/18 2243       Vital Signs: /87 (BP Location: Left arm)   Pulse 76   Temp 98 1 °F (36 7 °C) (Oral)   Resp 18   Ht 5' 7" (1 702 m)   Wt 88 kg (194 lb)   SpO2 99%   BMI 30 38 kg/m²     Medications:   Scheduled Meds:   Current Facility-Administered Medications:  acetaminophen 650 mg Oral Q4H PRN NAZ Singh-LIZETTE   ALPRAZolam 0 5 mg Oral TID PRN Flory Can MD   clopidogrel 75 mg Oral Daily Franklin Rodriguez PA-C   metoprolol 5 mg Intravenous Q3H PRN Flory Can MD   metoprolol tartrate 25 mg Oral Q12H [address] , MD   oxyCODONE 5 mg Oral BID PRN Franklin Rodriguez PA-C   oxymetazoline 2 spray Each Nare Q12H PRN Jimena Aly MD     Continuous Infusions:    PRN Meds:   acetaminophenx2    ALPRAZolam    metoprololx1    oxyCODONEx2    oxymetazolinex2    Abnormal Labs/Diagnostic Results:     Age/Sex: 67 y o  male     Assessment/Plan: Plan:      1    Tachycardia  Unknown cause at this time  Denies chest pain denies shortness of breath the last dictations  EKG reviewed  Continue his beta-blockers     #2 epistaxis  Anterior packing/history in detail with ENT  "Plan to come see the patient tomorrow and remove the packing"  Patient explained treatment plan and agreeable to stay   Overnight  ent  Plan is to leave packing in place for minimum 24-48 hours, hold asa/plavix or both if able, nosebleed precautions        If patient to remain in the hospital during this time due to tachycardia workup, will plan to pull pack at bedside, otherwise he may follow up as outpatient (Thur/Fri in one of the [employer] on Monday)  Continue abx ppx in the meantime        telm      Discharge Plan: home

## 2018-03-22 NOTE — PROGRESS NOTES
[facility] Progress Note  Patient: aThir Sifuentes 67 y o  male   MRN: [id number]  PCP: Leonor Shea MD  Unit/Bed#: - Encounter: [id number]  Date Of Visit: 18    Assessment:    Principal Problem:    Tachycardia  Active Problems:    Epistaxis      Plan:     1  Tachycardia  Unknown cause at this time  Denies chest pain denies shortness of breath the last dictations  EKG reviewed  Continue his beta-blockers    #2 epistaxis  Anterior packing/status post removal of packing today, patient is treatment plan discussed in detail with ENT, will monitor overnight if no bleeding plan to discharge in a m     3   History of CVA  With left-sided residual weakness  Continue Plavix  Properly lashed out nutritional modification including blood pressure management discussed  VTE Pharmacologic Prophylaxis:   Pharmacologic: Heparin  Mechanical VTE Prophylaxis in Place: Yes    Patient Centered Rounds: I have performed bedside rounds with nursing staff today  Discussions with Specialists or Other Care Team Provider: Tests  Education and Discussions with Family / Patient: Yes    Time Spent for Care: 45 minutes  More than 50% of total time spent on counseling and coordination of care as described above  Current Length of Stay: 1 day(s)    Current Patient Status: Inpatient   Certification Statement: The patient will continue to require additional inpatient hospital stay due to Epistaxis and tachyca    Discharge Plan / Estimated Discharge Date: To be determined    Code Status: Level 1 - Full Code      Subjective:   No complaints    Objective:     Vitals:   Temp (24hrs), Av 2 °F (36 8 °C), Min:97 8 °F (36 6 °C), Max:99 °F (37 2 °C)    HR:  [] 76  Resp:  [16-18] 18  BP: ()/() 147/87  SpO2:  [96 %-99 %] 99 %  Body mass index is 30 38 kg/m²       Input and Output Summary (last 24 hours):     No intake or output data in the 24 hours ending 18 1305    Physical Exam: Physical Exam  On examination he appeared in good health, Although appears to be very anxious Patient has normal mooand spirits  Anterior packing appreciated without any active bleeding Vital signs as documented  Skin warm and dry and without overt rashes  Neck without JVD  Lungs clear  Heart exam notable for regular rhythm, normal sounds and absence of murmurs, rubs or gallops  Abdomen unremarkable and without evidence of organomegaly, masses, or abdominal aortic enlargement  Extremities nonedematous  Labs:      Results from last 7 days  Lab Units 03/22/18  0544   WBC Thousand/uL 7 99   HEMOGLOBIN g/dL 12 1   HEMATOCRIT % 36 9   PLATELETS Thousands/uL 218   NEUTROS PCT % 65   LYMPHS PCT % 23   MONOS PCT % 10   EOS PCT % 2       Results from last 7 days  Lab Units 03/22/18  0544  03/20/18  1814   SODIUM mmol/L 139  < > 140   POTASSIUM mmol/L 4 0  < > 3 9   CHLORIDE mmol/L 103  < > 104   CO2 mmol/L 27  < > 23   BUN mg/dL 20  < > 19   CREATININE mg/dL 0 88  < > 0 74   CALCIUM mg/dL 8 7  < > 9 0   TOTAL PROTEIN g/dL  --   --  7 5   BILIRUBIN TOTAL mg/dL  --   --  0 20   ALK PHOS U/L  --   --  89   ALT U/L  --   --  43   AST U/L  --   --  22   GLUCOSE RANDOM mg/dL 106  < > 134   < > = values in this interval not displayed  Results from last 7 days  Lab Units 03/20/18  1814   INR  1 00       * I Have Reviewed All Lab Data Listed Above  * Additional Pertinent Lab Tests Reviewed:  [facility] Admission Reviewed    Imaging:    Imaging Reports Reviewed Today Include: Yes  Imaging Personally Reviewed by Myself Includes:  S    Recent Cultures (last 7 days):           Last 24 Hours Medication List:     Current Facility-Administered Medications:  acetaminophen 650 mg Oral Q4H PRN Fransisco Thomas PA-C   ALPRAZolam 0 5 mg Oral TID PRN Antoniette Opitz, MD   clopidogrel 75 mg Oral Daily Fransisco Thomas PA-C   oxyCODONE 5 mg Oral BID PRN Fransisco Thomas PA-C   oxymetazoline 2 spray Each Nare Q12H PRN Chong Quiroz MD        Today, Patient Was Seen By: Wilfrido Jaimes MD    ** Please Note: This note has been constructed using a voice recognition system   **

## 2018-03-23 VITALS
SYSTOLIC BLOOD PRESSURE: 135 MMHG | OXYGEN SATURATION: 95 % | DIASTOLIC BLOOD PRESSURE: 82 MMHG | HEART RATE: 94 BPM | BODY MASS INDEX: 30.45 KG/M2 | HEIGHT: 67 IN | RESPIRATION RATE: 18 BRPM | TEMPERATURE: 97.8 F | WEIGHT: 194 LBS

## 2018-03-23 LAB
ANION GAP SERPL CALCULATED.3IONS-SCNC: 9 MMOL/L (ref 4–13)
BUN SERPL-MCNC: 20 MG/DL (ref 5–25)
CALCIUM SERPL-MCNC: 8.5 MG/DL (ref 8.3–10.1)
CHLORIDE SERPL-SCNC: 105 MMOL/L (ref 100–108)
CO2 SERPL-SCNC: 25 MMOL/L (ref 21–32)
CREAT SERPL-MCNC: 0.81 MG/DL (ref 0.6–1.3)
GFR SERPL CREATININE-BSD FRML MDRD: 89 ML/MIN/1.73SQ M
GLUCOSE SERPL-MCNC: 97 MG/DL (ref 65–140)
POTASSIUM SERPL-SCNC: 3.4 MMOL/L (ref 3.5–5.3)
SODIUM SERPL-SCNC: 139 MMOL/L (ref 136–145)

## 2018-03-23 PROCEDURE — 80048 BASIC METABOLIC PNL TOTAL CA: CPT | Performed by: INTERNAL MEDICINE

## 2018-03-23 PROCEDURE — 99239 HOSP IP/OBS DSCHRG MGMT >30: CPT | Performed by: INTERNAL MEDICINE

## 2018-03-23 RX ORDER — CLOPIDOGREL BISULFATE 75 MG/1
75 TABLET ORAL DAILY
COMMUNITY

## 2018-03-23 RX ORDER — POTASSIUM CHLORIDE 20 MEQ/1
40 TABLET, EXTENDED RELEASE ORAL ONCE
Status: COMPLETED | OUTPATIENT
Start: 2018-03-23 | End: 2018-03-23

## 2018-03-23 RX ADMIN — OXYCODONE HYDROCHLORIDE 5 MG: 5 TABLET ORAL at 09:14

## 2018-03-23 RX ADMIN — ACETAMINOPHEN 650 MG: 325 TABLET, FILM COATED ORAL at 05:14

## 2018-03-23 RX ADMIN — POTASSIUM CHLORIDE 40 MEQ: 1500 TABLET, EXTENDED RELEASE ORAL at 12:05

## 2018-03-23 NOTE — DISCHARGE SUMMARY
Discharge Summary - [facility]    Patient Information: Bev Sandoval 67 y o  male MRN: [id number]  Unit/Bed#: -01 Encounter: [id number]    Discharging Physician / Practitioner: Irasema Reyes MD  PCP: Tarun Cedeño MD  Admission Date: 3/20/2018  Discharge Date: 03/23/18    Disposition:     Home    Reason for Admission: epistaxis    Discharge Diagnoses:     Principal Problem:    Tachycardia  Active Problems:    Epistaxis  Resolved Problems:    * No resolved hospital problems  *      Consultations During Hospital Stay:  · ENT    Procedures Performed:     · Anterior nasal packing    Significant Findings / Test Results:     · Anxiety/tachycardia/resolved    Incidental Findings:   · none     Test Results Pending at Discharge (will require follow up):   · none     Outpatient Tests Requested:  · Follow-up with ENT as previously discussed  · Return to ED if nasal bleeding recurs or any other concerns    Complications:  No known complications    Hospital Course:     Bev Sandoval is a 67 y o  male who presents with complaints of a blood nose that began around noon today  Patient denies any chest pain or shortness of breath  Patient states he is not able to get the bleeding to stop  Patient takes Plavix secondary history of CVA  One emerged department is noted the patient was tachycardic up to the 160s  Patient is asymptomatic with this  Patient states he had may have had some is similar in the past for he was monitored overnight and cleared  He subsequently underwent anterior nasal packing monitored overnight was seen evaluated and treated by ENT  Nasal packing was removed 24 hours prior to discharge and patient was monitored for recurrence of bleeding  He has remained with stable and currently has no complaints including no epistaxis  He was told to resume Plavix as he has had large ischemic CVA with residual weakness including left hemiparesis    He readily admits noncompliance with medications including noncompliance with aspirin patient was discussed about importance of Plavix for secondary prevention of CVA including appropriate last modifications blood pressure control is expressible understanding  Condition at Discharge: good     Discharge Day Visit / Exam:     Subjective:  No complaints  Vitals: Blood Pressure: 135/82 (03/23/18 1100)  Pulse: 94 (03/23/18 1100)  Temperature: 97 8 °F (36 6 °C) (03/23/18 1100)  Temp Source: Oral (03/23/18 1100)  Respirations: 18 (03/23/18 1100)  Height: 5' 7" (170 2 cm) (03/20/18 1741)  Weight - Scale: 88 kg (194 lb) (03/20/18 1741)  SpO2: 95 % (03/23/18 1100)  Exam:   Physical Exam  On examination he appeared in good health and spirits  Vital signs as documented  Skin warm and dry and without overt rashes  Neck without JVD  Lungs clear  Heart exam notable for regular rhythm, normal sounds and absence of murmurs, rubs or gallops  Abdomen unremarkable and without evidence of organomegaly, masses, or abdominal aortic enlargement  Extremities nonedematous  Discussion with Family: nonepatient's request "I have no family"    Discharge instructions/Information to patient and family:   See after visit summary for information provided to patient and family  Provisions for Follow-Up Care:  See after visit summary for information related to follow-up care and any pertinent home health orders  Planned Readmission: not at this time     Discharge Statement:  I spent 5 minutes discharging the patient  This time was spent on the day of discharge  I had direct contact with the patient on the day of discharge  Greater than 50% of the total time was spent examining patient, answering all patient questions, arranging and discussing plan of care with patient as well as directly providing post-discharge instructions  Additional time then spent on discharge activities      Discharge Medications:  See after visit summary for reconciled discharge medications provided to patient and family        ** Please Note: This note has been constructed using a voice recognition system **

## 2018-03-26 NOTE — CASE MANAGEMENT
Thera Max, RN Registered Nurse Signed   Case Management Date of Service: 3/21/2018  9:32 AM         []Hide copied text  Initial Clinical Review     Admission: Date/Time/Statement: OBS order   3/20  2259           Orders Placed This Encounter   Procedures    Place in Observation (expected length of stay for this patient is less than two midnights)       Standing Status:   Standing       Number of Occurrences:   1       Order Specific Question:   Admitting Physician       Answer:   Ernie Cantu [[id number]]       Order Specific Question:   Level of Care       Answer:   Med Surg [16]            ED: Date/Time/Mode of Arrival:             ED Arrival Information      Expected Arrival Acuity Means of Arrival Escorted By Service Admission Type     - 3/20/2018 17:36 [facility]) General Medicine Emergency     Arrival Complaint     NOSE BLEED             Chief Complaint:        Chief Complaint   Patient presents with    Nose Bleed       pt had nose bleed 1 wk ago was stopped by EMS then, nose bleed again today on Plavix, still actively bleeding, hypertensive and tachy with no hx of HTN or arrythmias          History of Illness: Yanet Orellana a 67 y  o  male who presents with complaints of a blood nose that began around noon today   Patient denies any chest pain or shortness of breath   Patient states he is not able to get the bleeding to stop   Patient takes Plavix secondary history of CVA   One emerged department is noted the patient was tachycardic up to the 160s   Patient is asymptomatic with this   Patient states he had may have had some is similar in the past for he was monitored overnight and cleared         ED Vital Signs:            ED Triage Vitals   Temperature Pulse Respirations Blood Pressure SpO2   03/20/18 1805 03/20/18 1741 03/20/18 1741 03/20/18 1741 03/20/18 1741   97 7 °F (36 5 °C) (!) 131 18 (!) 157/108 97 %       Temp Source Heart Rate Source Patient Position - Orthostatic VS BP Location FiO2 (%)   03/20/18 1805 03/20/18 1741 03/20/18 1741 03/20/18 1741 --   Oral Monitor Sitting Right arm         Pain Score           03/20/18 1741           No Pain                Wt Readings from Last 1 Encounters:   03/20/18 88 kg (194 lb)         Vital Signs (abnormal):   03/21/18 0519 -- 91 20 125/83 96 % -- LR   03/21/18 0345 -- 88 20 -- 95 % -- JLG   03/21/18 0056 --  109 20 134/94 96 % -- JLG   03/1945 --  135 19                 Abnormal Labs/Diagnostic Test Results: wbc  10 62  EKG - ST  136 and 127     ED Treatment:              Medication Administration from 03/20/2018 1736 to 03/21/2018 9548        Date/Time Order Dose Route Action Action by Comments       03/20/2018 2115 sodium chloride 0 9 % bolus 1,000 mL 0 mL Intravenous Stopped Huan Reyes RN         03/20/2018 1827 sodium chloride 0 9 % bolus 1,000 mL 1,000 mL Intravenous New Bag Denisa Schrader RN         03/20/2018 1827 oxymetazoline (AFRIN) 0 05 % nasal spray 2 spray 2 spray Each Nare Given Denisa Schrader RN         03/20/2018 1827 tranexamic acid 100mg/mL (for epistaxis) 500 mg 500 mg Nasal Given Denisa Schrader RN         03/20/2018 1826 lidocaine viscous (XYLOCAINE) 2 % mucosal solution 5 mL 5 mL Oral Given Denisa Schrader RN         03/20/2018 2030 metoprolol (LOPRESSOR) injection 5 mg 5 mg Intravenous Given Huan Reyes RN         03/21/2018 0700 oxyCODONE (ROXICODONE) IR tablet 5 mg 5 mg Oral Given Faustina Torres RN         03/21/2018 0054 oxyCODONE (ROXICODONE) IR tablet 5 mg 5 mg Oral Given Richa Reyes RN               Past Medical/Surgical History:         Active Ambulatory Problems     Diagnosis Date Noted    No Active Ambulatory Problems           Resolved Ambulatory Problems     Diagnosis Date Noted    No Resolved Ambulatory Problems           Past Medical History:   Diagnosis Date    Deviated nasal septum      Stroke [facility])           Admitting Diagnosis: Bleeding from the nose [R04 0]     Age/Sex: 67 y o  male     Assessment/Plan:       Tachycardia   Assessment & Plan     Admit telemetry  P r n   Lopressor          Epistaxis   Assessment & Plan     Maintain packing  Consult ENT             VTE Prophylaxis: Pharmacologic VTE Prophylaxis contraindicated due to Epistaxis  / sequential compression device   Code Status:  Full  POLST: There is no POLST form on file for this patient (pre-hospital)  Discussion with family: Lowella Boeck is no family present for evaluation      Anticipated Length of Stay: Khushboo Orozco will be admitted on an Observation basis with an anticipated length of stay of  less than 2 midnights    Justification for Hospital Stay:  Observation, ENT consult        Admission Orders:  Scheduled Meds:   Current Facility-Administered Medications:  acetaminophen 650 mg Oral Q4H PRN NAZ [place]-LIZETTE   clopidogrel 75 mg Oral Daily NAZ [place]-LIZETTE   oxyCODONE 5 mg Oral BID PRN NAZ [place]-LIZETTE      Continuous Infusions:    PRN Meds:   acetaminophen    oxyCODONE     Reg diet   Up and OOB as beverly   EKG prn cp   ENT consult   Tele   SCD  3/21 cbc

## 2018-03-31 ENCOUNTER — HOSPITAL ENCOUNTER (EMERGENCY)
Facility: HOSPITAL | Age: 73
Discharge: HOME | End: 2018-04-01
Attending: EMERGENCY MEDICINE | Admitting: EMERGENCY MEDICINE
Payer: OTHER GOVERNMENT

## 2018-03-31 DIAGNOSIS — H10.9 CONJUNCTIVITIS: Primary | ICD-10-CM

## 2018-04-01 VITALS
OXYGEN SATURATION: 95 % | WEIGHT: 194 LBS | HEART RATE: 90 BPM | TEMPERATURE: 98.2 F | BODY MASS INDEX: 29.4 KG/M2 | DIASTOLIC BLOOD PRESSURE: 95 MMHG | HEIGHT: 68 IN | RESPIRATION RATE: 20 BRPM | SYSTOLIC BLOOD PRESSURE: 156 MMHG

## 2018-04-01 PROCEDURE — 99284 EMERGENCY DEPT VISIT MOD MDM: CPT

## 2018-04-01 RX ORDER — POLYMYXIN B SULFATE AND TRIMETHOPRIM 1; 10000 MG/ML; [USP'U]/ML
1 SOLUTION OPHTHALMIC EVERY 4 HOURS
Qty: 10 ML | Refills: 0 | Status: SHIPPED | OUTPATIENT
Start: 2018-04-01 | End: 2018-04-08

## 2018-04-01 RX ORDER — PROPARACAINE HYDROCHLORIDE 5 MG/ML
1 SOLUTION/ DROPS OPHTHALMIC ONCE
Status: COMPLETED | OUTPATIENT
Start: 2018-04-01 | End: 2018-04-01

## 2018-04-01 RX ADMIN — FLUORESCEIN SODIUM 1 STRIP: 0.6 STRIP OPHTHALMIC at 01:24

## 2018-04-01 RX ADMIN — PROPARACAINE HYDROCHLORIDE 1 DROP: 5 SOLUTION/ DROPS OPHTHALMIC at 01:24

## 2018-04-01 NOTE — DISCHARGE INSTRUCTIONS

## 2018-04-01 NOTE — ED PROVIDER NOTES
History  Chief Complaint   Patient presents with    Diplopia     Double vision x 2 days  Just released from here for nose bleed  Slight swelling around left eye  This 80-year-old male presented for evaluation of mild discomfort and discharge from his left eye  Although the patient's triage note notes Екатерина Corona the patient denied this to me despite being as in multiple different ways  When I asked the patient several times about visual deficits, the only abnormality that he reported was when I look a certain direction (specifically down and to the left), things look a little blurry "  During the course of my interview, the patient adamantly denied "double vision" or "seeing two of anything" multiple times  Patient rather is complaining of mild discomfort and swelling around his left eye over the past 1 to 2 days  He has also had some mild clear discharge  He reports no pain  He reports normal vision, aside from as documented above, things look a little blurry when I look a certain direction patient also admits that these symptoms have been intermittent, and he is not currently having them  Patient does have a history of a CVA in the past, for which she takes Plavix  He was recently admitted to our hospital secondary to nose bleeds  He was discharged the following day  He does have residual left-sided weakness secondary to his previous CVA, but he reports no new weakness, numbness, tingling  He has been ambulating as he normally does  He denies headache or neck pain  He has not had fevers  He denies recent URI symptoms  History provided by:  Patient   used: No        Prior to Admission Medications   Prescriptions Last Dose Informant Patient Reported? Taking?    clopidogrel (PLAVIX) 75 mg tablet   Yes No   Sig: Take 75 mg by mouth daily      Facility-Administered Medications: None       Past Medical History:   Diagnosis Date    Deviated nasal septum     Stroke ([facility] [address] ) History reviewed  No pertinent surgical history  History reviewed  No pertinent family history  I have reviewed and agree with the history as documented  Social History   Substance Use Topics    Smoking status: Former Smoker    Smokeless tobacco: Never Used    Alcohol use No        Review of Systems   Constitutional: Negative for activity change, appetite change, diaphoresis, fatigue and fever  HENT: Negative for congestion, rhinorrhea, sore throat and trouble swallowing  Eyes: Positive for discharge and itching  Negative for photophobia and pain  Respiratory: Negative for apnea, cough, chest tightness and shortness of breath  Cardiovascular: Negative for chest pain  Gastrointestinal: Negative for abdominal pain, blood in stool, diarrhea, nausea and vomiting  Endocrine: Negative for polyphagia and polyuria  Genitourinary: Negative for dysuria, flank pain, frequency, hematuria and urgency  Musculoskeletal: Negative for back pain, gait problem, neck pain and neck stiffness  Skin: Negative for color change and rash  Allergic/Immunologic: Negative for immunocompromised state  Neurological: Negative for dizziness, speech difficulty, numbness and headaches  Hematological: Does not bruise/bleed easily  Psychiatric/Behavioral: Negative for confusion and suicidal ideas  Physical Exam  ED Triage Vitals [03/31/18 2251]   Temperature Pulse Respirations Blood Pressure SpO2   98 2 °F (36 8 °C) 105 20 (!) 163/113 97 %      Temp Source Heart Rate Source Patient Position - Orthostatic VS BP Location FiO2 (%)   Oral Monitor Lying Right arm --      Pain Score       No Pain           Orthostatic Vital Signs  Vitals:    03/31/18 2251 04/01/18 0045   BP: (!) 163/113 156/95   Pulse: 105 90   Patient Position - Orthostatic VS: Lying        Physical Exam   Constitutional: He is oriented to person, place, and time  He appears well-developed and well-nourished  HENT:   Head: Normocephalic  Eyes: EOM are normal  Pupils are equal, round, and reactive to light  Left eye exhibits discharge  No scleral icterus  The left conjunctiva is erythematous and injected  There is a scant amount of clear discharge  Minimal periorbital swelling with no cellulitic changes  Patient has no pain with EOM  Vision is grossly normal  No focal corneal uptake of fluorescein on woods lamp exam, no dusty sign  Neck: Normal range of motion  Neck supple  No JVD present  Cardiovascular: Normal rate and regular rhythm  Abdominal: Soft  Bowel sounds are normal  He exhibits no distension  There is no tenderness  There is no rebound and no guarding  Musculoskeletal: Normal range of motion  He exhibits no tenderness or deformity  Lymphadenopathy:     He has no cervical adenopathy  Neurological: He is alert and oriented to person, place, and time  Coordination normal    Residual weakness of the left upper and lower extremity, baseline per patient  Otherwise, GCS is 15 and nonfocal    Skin: Skin is warm and dry  He is not diaphoretic  Psychiatric: He has a normal mood and affect  Vitals reviewed  ED Medications  Medications   fluorescein sodium sterile ophthalmic strip 1 strip (1 strip Left Eye Given 4/1/18 0124)   proparacaine (ALCAINE) 0 5 % ophthalmic solution 1 drop (1 drop Left Eye Given 4/1/18 0124)       Diagnostic Studies  Results Reviewed     None                 No orders to display              Procedures  Procedures       Phone Contacts  ED Phone Contact    ED Course  ED Course                                MDM  Number of Diagnoses or Management Options  Conjunctivitis: new and requires workup  Diagnosis management comments: 70-year-old male presented complaining of 1 to 2 day history of left eye itching and discharge  No visual symptoms in the emergency department  Exam is consistent with conjunctivitis  Will treat, referred to Ophthalmology for follow-up    Discussed return precautions Amount and/or Complexity of Data Reviewed  Review and summarize past medical records: yes      CritCare Time    Disposition  Final diagnoses:   Conjunctivitis     Time reflects when diagnosis was documented in both MDM as applicable and the Disposition within this note     Time User Action Codes Description Comment    4/1/2018  2:16 AM Gagandeeprachna Chaseker Add [H10 9] Conjunctivitis       ED Disposition     ED Disposition Condition Comment    Discharge  Maxime Quevedo discharge to home/self care  Condition at discharge: Good        Follow-up Information     Follow up With Specialties Details [facility]  Please call to arrange for follow-up in 2 days  If you have new or worsening symptoms please call your doctor or return to the emergency department  [address] [facility]  [place]          Discharge Medication List as of 4/1/2018  2:23 AM      START taking these medications    Details   polymyxin b-trimethoprim (POLYTRIM) ophthalmic solution Administer 1 drop into the left eye every 4 (four) hours for 7 days, Starting Sun 4/1/2018, Until Sun 4/8/2018, Print         CONTINUE these medications which have NOT CHANGED    Details   clopidogrel (PLAVIX) 75 mg tablet Take 75 mg by mouth daily, Historical Med           No discharge procedures on file      ED Provider  Electronically Signed by           Amaya Deutsch MD  04/25/18 4135